# Patient Record
(demographics unavailable — no encounter records)

---

## 2025-01-15 NOTE — CARDIOLOGY SUMMARY
[de-identified] : 1/7/2025 Sinus Rhythm, WITHIN NORMAL LIMITS [de-identified] : 12/12/2023.  Avg HR 80 bpm, 15 SVT, longest 16 beats  bpm, fastest 8 beats with max HR of 240 bpm, occasional VE's [de-identified] : 6/11/2011.  7 METS, EKG changes suggestive of ischemia [de-identified] : 8/17/2018    Mild MR, Normal LV size and function, est PASP=37 mmHg  12/27/2023.  	1. Technically difficult image quality. 2. Left ventricular endocardium is not well visualized. 3. Left ventricular systolic function is grossly normal with an ejection fraction visually estimated at 60 %. 4. Normal left ventricular diastolic function. 5. Trace aortic regurgitation. 6. Trace mitral regurgitation. 7. Mild tricuspid regurgitation. 8. Interatrial septum is aneurysmal. 9. Estimated pulmonary artery systolic pressure is 33 mmHg, consistent with borderline pulmonary hypertension. 10. No pericardial effusion seen. 11. Compared to the transthoracic echocardiogram performed on 8/17/2018 there have been no significant interval changes. [de-identified] : 12/22/2023 MRI brain without contrast shows mild age-related cerebral and cerebral volume loss and chronic ischemic changes.  No acute infarction. 12/22/2023 MR arteriogram of head is no occlusive disease, aneurysm or AV malformation.  Impression normal study 12/22/2023 MR arteriogram neck limited study due to motion artifact.  There appears to be high-grade stenosis of approximately 80% involving the left carotid bulb secondary to plaque recommendation is to consider a CT a of the neck.  1/5/2024 CT angio of the head without and with IV contrast, CT angio neck without and with IV contrast impression: Right pontine punctuate hypodensity representing calcification.  No acute territorial infarction or acute intracranial hemorrhage.  CT angio of the neck left carotid bulb calcified atherosclerosis with up to approximately 72% stenosis of left proximal ICA with flow.  The right ICA is patent.  CT angiography of the head no intracranial vascular occlusion, aneurysm or vascular malformation. [de-identified] : 1/9/2024. 5METS, 4 min. 66 bpm to peak 127 bpm 87%PMHR 1. Electrocardiogram ischemic changes: 1-1.5mm upsloping ST segment depressions inferolaterally. 2. In recovery ST segment depression changed to downsloping 3. EKG changes suggestive of exercise induced ischemia 4. Myocardial Perfusion: Abnormal. Perfusion: Qualitative Findings: There are medium-sized, moderate defect(s) in the mid anterior and mid anterolateral walls and mid lateral wall that are reversible suggestive of ischemia in LAD region ; perhaps some i LCx too  Ventricular Function: The left ventricle is normal in function and normal in size. The resting left ventricular EF% is 72 %. [de-identified] : 8/17/2018. Mild non obstructive carotid disease seen bilaterslly

## 2025-01-15 NOTE — REASON FOR VISIT
[Symptom and Test Evaluation] : symptom and test evaluation [Coronary Artery Disease] : coronary artery disease [Hyperlipidemia] : hyperlipidemia [FreeTextEntry1] : Abnormal MPI, 5 METs with evidence of LAD disease and possible MVD.

## 2025-01-15 NOTE — DISCUSSION/SUMMARY
[EKG obtained to assist in diagnosis and management of assessed problem(s)] : EKG obtained to assist in diagnosis and management of assessed problem(s) [FreeTextEntry1] : 1.  We again reviewed the importance of a healthy lifestyle including: normal body weight, regular physical activity with 30 to 45 minutes of exercise most days of the week, Mediterranean style diet and limiting consumption of sweets, highly processed foods, meats and saturated fats.   2.  Continue present Rx including low-dose aspirin, statin, ACE inhibitor and increase beta-blocker to atenolol 50 mg twice daily. 3.  Again, it was recommended that she proceed with coronary angiography and possible PCI in the setting of probable LAD and possible multivessel disease.   4.  Check lipids and other pre-Cath blood work.  Will probably increase atorvastatin from 40 to 80 mg depending on these results. 5.  We reviewed red flag symptoms such as ongoing chest pain, shortness of breath or sustained palpitations who for which she should call 911. 6. Cardiology follow-up here will be after cardiac catheterization. 7.  I also suggested she follow-up with Dr Shawnee Abbott for primary care including diabetes and positive screening for depression.   I spent 35 minutes with the patient inclduing: 10 minutes preparing for the visit 15 minutes face to face and  10 minutes on documentation and coordination of care.

## 2025-01-15 NOTE — HISTORY OF PRESENT ILLNESS
[FreeTextEntry1] : 74-year-old female with multiple cardiac risk factors including diabetes mellitus type 2, hypertension, hyperlipidemia, abnormal MPI 1/9/2024, 5 METs with evidence of LAD disease and possible MVD.  Based on the abnormal MPI last January she was referred for coronary angiography and possible PCI.  She saw Dr. Valentino in February for a second opinion.  He also recommended coronary angiography and possible PCI.  However, she never followed through with this recommendation.  She returns today for routine follow-up.  She reports progressive symptoms.  She is having chest discomfort often when lying on her back also sometimes with activity is driving here today.  She finds that her walking is more limited numbness of breath after quarter mile, and she is having shortness of breath with a flight of stairs.  She denies palpitations, orthopnea, PND, peripheral edema, presyncope.  She is having less vertigo.  She has not had a recurrent fall. Neurology evaluation after that included an MRI and CT angio of the neck that demonstrated left carotid bulb calcified atherosclerosis with up to approximately 72% stenosis of left proximal ICA with flow.  She is not careful with her diet.  She admits to being a sugarholic.  Hemoglobin A1c 10/31/24 was 6.1.  She has not had a recent lipid profile.   Laboratory 10/31/2024: , K 4.9, creatinine 0.91, Ca 10.1, eGFR 66, Hgb A1c 6.1, TSH 1.24, T4 11.3

## 2025-01-15 NOTE — CARDIOLOGY SUMMARY
[de-identified] : 1/7/2025 Sinus Rhythm, WITHIN NORMAL LIMITS [de-identified] : 12/12/2023.  Avg HR 80 bpm, 15 SVT, longest 16 beats  bpm, fastest 8 beats with max HR of 240 bpm, occasional VE's [de-identified] : 6/11/2011.  7 METS, EKG changes suggestive of ischemia [de-identified] : 8/17/2018    Mild MR, Normal LV size and function, est PASP=37 mmHg  12/27/2023.  	1. Technically difficult image quality. 2. Left ventricular endocardium is not well visualized. 3. Left ventricular systolic function is grossly normal with an ejection fraction visually estimated at 60 %. 4. Normal left ventricular diastolic function. 5. Trace aortic regurgitation. 6. Trace mitral regurgitation. 7. Mild tricuspid regurgitation. 8. Interatrial septum is aneurysmal. 9. Estimated pulmonary artery systolic pressure is 33 mmHg, consistent with borderline pulmonary hypertension. 10. No pericardial effusion seen. 11. Compared to the transthoracic echocardiogram performed on 8/17/2018 there have been no significant interval changes. [de-identified] : 12/22/2023 MRI brain without contrast shows mild age-related cerebral and cerebral volume loss and chronic ischemic changes.  No acute infarction. 12/22/2023 MR arteriogram of head is no occlusive disease, aneurysm or AV malformation.  Impression normal study 12/22/2023 MR arteriogram neck limited study due to motion artifact.  There appears to be high-grade stenosis of approximately 80% involving the left carotid bulb secondary to plaque recommendation is to consider a CT a of the neck.  1/5/2024 CT angio of the head without and with IV contrast, CT angio neck without and with IV contrast impression: Right pontine punctuate hypodensity representing calcification.  No acute territorial infarction or acute intracranial hemorrhage.  CT angio of the neck left carotid bulb calcified atherosclerosis with up to approximately 72% stenosis of left proximal ICA with flow.  The right ICA is patent.  CT angiography of the head no intracranial vascular occlusion, aneurysm or vascular malformation. [de-identified] : 1/9/2024. 5METS, 4 min. 66 bpm to peak 127 bpm 87%PMHR 1. Electrocardiogram ischemic changes: 1-1.5mm upsloping ST segment depressions inferolaterally. 2. In recovery ST segment depression changed to downsloping 3. EKG changes suggestive of exercise induced ischemia 4. Myocardial Perfusion: Abnormal. Perfusion: Qualitative Findings: There are medium-sized, moderate defect(s) in the mid anterior and mid anterolateral walls and mid lateral wall that are reversible suggestive of ischemia in LAD region ; perhaps some i LCx too  Ventricular Function: The left ventricle is normal in function and normal in size. The resting left ventricular EF% is 72 %. [de-identified] : 8/17/2018. Mild non obstructive carotid disease seen bilaterslly

## 2025-01-15 NOTE — ASSESSMENT
[FreeTextEntry1] : 74-year-old woman with multiple risk factors including diabetes, hyperlipidemia and hypertension.  She is having progressive exertional symptoms.  TTE and MPI demonstrate normal LV systolic function at rest. During exercise to 5 METs there is evidence of LAD disease and possible MVD.

## 2025-01-15 NOTE — PHYSICAL EXAM
[Well Developed] : well developed [Well Nourished] : well nourished [No Acute Distress] : no acute distress [Normal Conjunctiva] : normal conjunctiva [Normal Venous Pressure] : normal venous pressure [No Carotid Bruit] : no carotid bruit [Normal S1, S2] : normal S1, S2 [No Murmur] : no murmur [No Rub] : no rub [Clear Lung Fields] : clear lung fields [Good Air Entry] : good air entry [No Respiratory Distress] : no respiratory distress  [Soft] : abdomen soft [Non Tender] : non-tender [Normal Bowel Sounds] : normal bowel sounds [Normal Gait] : normal gait [No Edema] : no edema [No Cyanosis] : no cyanosis [Normal PT B/L] : normal PT B/L [No Focal Deficits] : no focal deficits [Normal Speech] : normal speech [Alert and Oriented] : alert and oriented [Appears Anxious] : appears anxious [Obese] : obese [Moves all extremities] : moves all extremities [de-identified] : Diminished radial pulses

## 2025-01-15 NOTE — ADDENDUM
[FreeTextEntry1] : CT angiography of the neck demonstrates left carotid bulb calcified atherosclerosis with up to approximately 72% stenosis of left proximal ICA.  She has scheduled follow-up with neurology regarding these findings and potential clinical significance.

## 2025-01-15 NOTE — PHYSICAL EXAM
[Well Developed] : well developed [Well Nourished] : well nourished [No Acute Distress] : no acute distress [Normal Conjunctiva] : normal conjunctiva [Normal Venous Pressure] : normal venous pressure [No Carotid Bruit] : no carotid bruit [Normal S1, S2] : normal S1, S2 [No Murmur] : no murmur [No Rub] : no rub [Clear Lung Fields] : clear lung fields [Good Air Entry] : good air entry [No Respiratory Distress] : no respiratory distress  [Soft] : abdomen soft [Non Tender] : non-tender [Normal Bowel Sounds] : normal bowel sounds [Normal Gait] : normal gait [No Edema] : no edema [No Cyanosis] : no cyanosis [Normal PT B/L] : normal PT B/L [No Focal Deficits] : no focal deficits [Normal Speech] : normal speech [Alert and Oriented] : alert and oriented [Appears Anxious] : appears anxious [Obese] : obese [Moves all extremities] : moves all extremities [de-identified] : Diminished radial pulses

## 2025-01-15 NOTE — CARDIOLOGY SUMMARY
[de-identified] : 1/7/2025 Sinus Rhythm, WITHIN NORMAL LIMITS [de-identified] : 12/12/2023.  Avg HR 80 bpm, 15 SVT, longest 16 beats  bpm, fastest 8 beats with max HR of 240 bpm, occasional VE's [de-identified] : 6/11/2011.  7 METS, EKG changes suggestive of ischemia [de-identified] : 8/17/2018    Mild MR, Normal LV size and function, est PASP=37 mmHg  12/27/2023.  	1. Technically difficult image quality. 2. Left ventricular endocardium is not well visualized. 3. Left ventricular systolic function is grossly normal with an ejection fraction visually estimated at 60 %. 4. Normal left ventricular diastolic function. 5. Trace aortic regurgitation. 6. Trace mitral regurgitation. 7. Mild tricuspid regurgitation. 8. Interatrial septum is aneurysmal. 9. Estimated pulmonary artery systolic pressure is 33 mmHg, consistent with borderline pulmonary hypertension. 10. No pericardial effusion seen. 11. Compared to the transthoracic echocardiogram performed on 8/17/2018 there have been no significant interval changes. [de-identified] : 12/22/2023 MRI brain without contrast shows mild age-related cerebral and cerebral volume loss and chronic ischemic changes.  No acute infarction. 12/22/2023 MR arteriogram of head is no occlusive disease, aneurysm or AV malformation.  Impression normal study 12/22/2023 MR arteriogram neck limited study due to motion artifact.  There appears to be high-grade stenosis of approximately 80% involving the left carotid bulb secondary to plaque recommendation is to consider a CT a of the neck.  1/5/2024 CT angio of the head without and with IV contrast, CT angio neck without and with IV contrast impression: Right pontine punctuate hypodensity representing calcification.  No acute territorial infarction or acute intracranial hemorrhage.  CT angio of the neck left carotid bulb calcified atherosclerosis with up to approximately 72% stenosis of left proximal ICA with flow.  The right ICA is patent.  CT angiography of the head no intracranial vascular occlusion, aneurysm or vascular malformation. [de-identified] : 1/9/2024. 5METS, 4 min. 66 bpm to peak 127 bpm 87%PMHR 1. Electrocardiogram ischemic changes: 1-1.5mm upsloping ST segment depressions inferolaterally. 2. In recovery ST segment depression changed to downsloping 3. EKG changes suggestive of exercise induced ischemia 4. Myocardial Perfusion: Abnormal. Perfusion: Qualitative Findings: There are medium-sized, moderate defect(s) in the mid anterior and mid anterolateral walls and mid lateral wall that are reversible suggestive of ischemia in LAD region ; perhaps some i LCx too  Ventricular Function: The left ventricle is normal in function and normal in size. The resting left ventricular EF% is 72 %. [de-identified] : 8/17/2018. Mild non obstructive carotid disease seen bilaterslly

## 2025-01-15 NOTE — PHYSICAL EXAM
[Well Developed] : well developed [Well Nourished] : well nourished [No Acute Distress] : no acute distress [Normal Conjunctiva] : normal conjunctiva [Normal Venous Pressure] : normal venous pressure [No Carotid Bruit] : no carotid bruit [Normal S1, S2] : normal S1, S2 [No Murmur] : no murmur [No Rub] : no rub [Clear Lung Fields] : clear lung fields [Good Air Entry] : good air entry [No Respiratory Distress] : no respiratory distress  [Soft] : abdomen soft [Non Tender] : non-tender [Normal Bowel Sounds] : normal bowel sounds [Normal Gait] : normal gait [No Edema] : no edema [No Cyanosis] : no cyanosis [Normal PT B/L] : normal PT B/L [No Focal Deficits] : no focal deficits [Normal Speech] : normal speech [Alert and Oriented] : alert and oriented [Appears Anxious] : appears anxious [Obese] : obese [Moves all extremities] : moves all extremities [de-identified] : Diminished radial pulses

## 2025-01-30 NOTE — PHYSICAL EXAM
[Well Developed] : well developed [Well Nourished] : well nourished [No Acute Distress] : no acute distress [Normal Conjunctiva] : normal conjunctiva [Normal Venous Pressure] : normal venous pressure [Clear Lung Fields] : clear lung fields [Good Air Entry] : good air entry [No Respiratory Distress] : no respiratory distress  [Soft] : abdomen soft [Non Tender] : non-tender [Normal Gait] : normal gait [Moves all extremities] : moves all extremities [No Focal Deficits] : no focal deficits [Normal Speech] : normal speech [Alert and Oriented] : alert and oriented [Normal memory] : normal memory [de-identified] : RRR, S1 and S2, no mrg  [de-identified] : Well healed RRA and RFA sites with intact distal pulses [facility]

## 2025-01-30 NOTE — DISCUSSION/SUMMARY
[FreeTextEntry1] : 1) CAD - Stable, obstructive CAD seen on 1/24/25 [facility]. Plan for optimal medical management of CTOs - Continue atenolol 50mg BID - Continue ASA 81mg qD - Continue Atorvastatin 40mg qHS - Continue Ramipril 2.5mg qD  2) HTN - Normotensive during visit - Continue atenolol 50mg BID and ramipril 2.5mg qD  3) HLD - Lipid Panel 1/18/25: , HDL 44, LDL 54,  - Continue atorvastatin 40mg qHS   4) Palpitations - Cardiac monitor from 12/2023 showed SR with runs of SVT - Continue atenolol 50mg BID

## 2025-01-30 NOTE — REVIEW OF SYSTEMS
[Negative] : Heme/Lymph [FreeTextEntry2] : As per HPI [FreeTextEntry9] : Chronic Back and shoulder pain [de-identified] : As per HPI

## 2025-01-30 NOTE — PHYSICAL EXAM
[Well Developed] : well developed [Well Nourished] : well nourished [No Acute Distress] : no acute distress [Normal Conjunctiva] : normal conjunctiva [Normal Venous Pressure] : normal venous pressure [Clear Lung Fields] : clear lung fields [Good Air Entry] : good air entry [No Respiratory Distress] : no respiratory distress  [Soft] : abdomen soft [Non Tender] : non-tender [Normal Gait] : normal gait [Moves all extremities] : moves all extremities [No Focal Deficits] : no focal deficits [Normal Speech] : normal speech [Alert and Oriented] : alert and oriented [Normal memory] : normal memory [de-identified] : RRR, S1 and S2, no mrg  [de-identified] : Well healed RRA and RFA sites with intact distal pulses

## 2025-01-30 NOTE — ASSESSMENT
[FreeTextEntry1] : 73 year old female with PMH of HTN, DM2, HLD, L carotid stenosis, and multivessel stable CAD seen on diagnostic C from 1/24/2025 who presents for post catheterization followup. Patient has no acute complaints and well healed RRA and RFA access sites.  Plan to continue medical management for CAD, no plan for  intervention at this time.  If patient develops anginal symptoms despite optimal medical therapy, would consider re-evaluation for possible  intervention.

## 2025-01-30 NOTE — DISCUSSION/SUMMARY
[FreeTextEntry1] : 1) CAD - Stable, obstructive CAD seen on 1/24/25 [facility]. Plan for optimal medical management of CTOs - Continue atenolol 50mg BID - Continue ASA 81mg qD - Continue Atorvastatin 40mg qHS - Continue Ramipril 2.5mg qD  2) HTN - Normotensive during visit - Continue atenolol 50mg BID and ramipril 2.5mg qD  3) HLD - Lipid Panel 1/18/25: , HDL 44, LDL 54,  - Continue atorvastatin 40mg qHS   4) Palpitations - Cardiac monitor from 12/2023 showed SR with runs of SVT - Continue atenolol 50mg BID

## 2025-01-30 NOTE — HISTORY OF PRESENT ILLNESS
[FreeTextEntry1] : Patient is a 73 year old female with PMH of HTN, DM2, HLD, L carotid stenosis, and multivessel stable CAD seen on diagnostic LHC from 2025 who presents for post catheterization followup.  Patient has no acute complaints, reports chronic back pain and chronic fatigue but no notable chest pain or dyspnea.  She previously suffered from migraines and notes an increase in frequency over the past months.  She continues taking her medication as prescribed.  She underwent LHC on  via RFA access which showed moderate, heavily calcified LAD, LCx and RCA disease with  of D1, OM1, and distal small RPL2 with normal LVEDP. No intervention performed and planned for medical management.    Prior Studies: Labs 2025: Lipid Panel: , HDL 44, LDL 54, , BUN/Cr 22/0.87, K 4.4, H/H 13.4/40.1 LHC 2025: Multivessel, heavily calcified CAD with mod LAD stenosis, mild-mod LCx stenosis, mod mid RCA stenosis,  of D1,  of OM1,  of RPL2, normal LVEDP (12 mmHg) Remote/Ambulatory Rhythm Monitorin2023. Avg HR 80 bpm, 15 SVT, longest 16 beats  bpm, fastest 8 beats with max HR of 240 bpm, occasional VE's   Stress Test: 2011. 7METS, EKG changes suggestive of ischemia   Echo: 2018 Mild MR, Normal LV size and function, est PASP=37 mmHg TTE 2023.  1. Technically difficult image quality. 2. Left ventricular endocardium is not well visualized. 3. Left ventricular systolic function is grossly normal with an ejection fraction visually estimated at 60 %. 4. Normal left ventricular diastolic function. 5. Trace aortic regurgitation. 6. Trace mitral regurgitation. 7. Mild tricuspid regurgitation. 8. Interatrial septum is aneurysmal. 9. Estimated pulmonary artery systolic pressure is 33 mmHg, consistent with borderline pulmonary hypertension. 10. No pericardial effusion seen. 11. Compared to the transthoracic echocardiogram performed on 2018 there have been no significant interval changes.   CT/MRI: 2023 MRI brain without contrast shows mild age-related cerebral and cerebral volume loss and chronic ischemic changes. No acute infarction. 2023 MR arteriogram of head is no occlusive disease, aneurysm or AV malformation. Impression normal study  2023 MR arteriogram neck limited study due to motion artifact. There appears to be high-grade stenosis of approximately 80% involving the left carotid bulb secondary to plaque recommendation is to consider a CT a of the neck. 2024 CT angio of the head without and with IV contrast, CT angio neck without and with IV contrast impression: Right pontine punctuate hypodensity representing calcification. No acute territorial infarction or acute intracranial hemorrhage. CT angio of the neck left carotid bulb calcified atherosclerosis with up to approximately 72% stenosis of left proximal ICA with flow. The right ICA is patent. CT angiography of the head no intracranial vascular occlusion, aneurysm or vascular malformation.   Viability/Other Nuclear: 2024. 5METS, 4 min. 66bpm to peak 127 bpm 87%PMHR 1. Electrocardiogram ischemic changes: 1-1.5mm upsloping ST segment depressions inferolaterally. 2. In recovery ST segment depression changed to downsloping 3. EKG changes suggestive of exercise induced ischemia 4. Myocardial Perfusion: Abnormal. Perfusion: Qualitative Findings: There are medium-sized, moderate defect(s) in the mid anterior and mid anterolateral walls and mid lateral wall that are reversible suggestive of ischemia in LAD region ; perhaps some i LCx too Ventricular Function: The left ventricle is normal in function and normal in size. The resting left ventricular EF% is 72 %.   Carotid/Aorta/Peripheral Vascular: 2018. Mild non obstructive carotid disease seen bilaterally

## 2025-01-30 NOTE — REVIEW OF SYSTEMS
[Negative] : Heme/Lymph [FreeTextEntry2] : As per HPI [FreeTextEntry9] : Chronic Back and shoulder pain [de-identified] : As per HPI

## 2025-04-07 NOTE — CARDIOLOGY SUMMARY
[de-identified] : 1/7/2025 Sinus Rhythm, WITHIN NORMAL LIMITS [de-identified] : 12/12/2023.  Avg HR 80 bpm, 15 SVT, longest 16 beats  bpm, fastest 8 beats with max HR of 240 bpm, occasional VE's [de-identified] : 6/11/2011.  7 METS, EKG changes suggestive of ischemia [de-identified] : 8/17/2018    Mild MR, Normal LV size and function, est PASP=37 mmHg  12/27/2023.  	1. Technically difficult image quality. 2. Left ventricular endocardium is not well visualized. 3. Left ventricular systolic function is grossly normal with an ejection fraction visually estimated at 60 %. 4. Normal left ventricular diastolic function. 5. Trace aortic regurgitation. 6. Trace mitral regurgitation. 7. Mild tricuspid regurgitation. 8. Interatrial septum is aneurysmal. 9. Estimated pulmonary artery systolic pressure is 33 mmHg, consistent with borderline pulmonary hypertension. 10. No pericardial effusion seen. 11. Compared to the transthoracic echocardiogram performed on 8/17/2018 there have been no significant interval changes. [de-identified] : 12/22/2023 MRI brain without contrast shows mild age-related cerebral and cerebral volume loss and chronic ischemic changes.  No acute infarction. 12/22/2023 MR arteriogram of head is no occlusive disease, aneurysm or AV malformation.  Impression normal study 12/22/2023 MR arteriogram neck limited study due to motion artifact.  There appears to be high-grade stenosis of approximately 80% involving the left carotid bulb secondary to plaque recommendation is to consider a CT a of the neck.  1/5/2024 CT angio of the head without and with IV contrast, CT angio neck without and with IV contrast impression: Right pontine punctuate hypodensity representing calcification.  No acute territorial infarction or acute intracranial hemorrhage.  CT angio of the neck left carotid bulb calcified atherosclerosis with up to approximately 72% stenosis of left proximal ICA with flow.  The right ICA is patent.  CT angiography of the head no intracranial vascular occlusion, aneurysm or vascular malformation. [de-identified] : 1/9/2024. 5METS, 4 min. 66 bpm to peak 127 bpm 87%PMHR 1. Electrocardiogram ischemic changes: 1-1.5mm upsloping ST segment depressions inferolaterally. 2. In recovery ST segment depression changed to downsloping 3. EKG changes suggestive of exercise induced ischemia 4. Myocardial Perfusion: Abnormal. Perfusion: Qualitative Findings: There are medium-sized, moderate defect(s) in the mid anterior and mid anterolateral walls and mid lateral wall that are reversible suggestive of ischemia in LAD region ; perhaps some i LCx too  Ventricular Function: The left ventricle is normal in function and normal in size. The resting left ventricular EF% is 72 %. [de-identified] : 8/17/2018. Mild non obstructive carotid disease seen bilaterslly

## 2025-04-07 NOTE — PHYSICAL EXAM
[Well Developed] : well developed [Well Nourished] : well nourished [No Acute Distress] : no acute distress [Obese] : obese [Normal Conjunctiva] : normal conjunctiva [Normal Venous Pressure] : normal venous pressure [No Carotid Bruit] : no carotid bruit [Normal S1, S2] : normal S1, S2 [No Murmur] : no murmur [No Rub] : no rub [Clear Lung Fields] : clear lung fields [Good Air Entry] : good air entry [No Respiratory Distress] : no respiratory distress  [Soft] : abdomen soft [Non Tender] : non-tender [Normal Bowel Sounds] : normal bowel sounds [Normal Gait] : normal gait [No Edema] : no edema [No Cyanosis] : no cyanosis [Normal PT B/L] : normal PT B/L [Moves all extremities] : moves all extremities [No Focal Deficits] : no focal deficits [Alert and Oriented] : alert and oriented [Appears Anxious] : appears anxious [de-identified] : Diminished radial pulses

## 2025-04-07 NOTE — REASON FOR VISIT
[Symptom and Test Evaluation] : symptom and test evaluation [Hyperlipidemia] : hyperlipidemia [Coronary Artery Disease] : coronary artery disease [FreeTextEntry1] :  Continue present Rx including low-dose aspirin, statin, ACE inhibitor and increase beta-blocker to atenolol 50 mg twice daily. Check lipids and other pre-Cath blood work.  Will probably increase atorvastatin from 40 to 80 mg depending on these results. 5.  We reviewed red flag symptoms such as ongoing chest pain, shortness of breath or sustained palpitations who for which she should call 911.  I also suggested she follow-up with Dr Shawnee Abbott for primary care including diabetes and positive screening for depression. Home

## 2025-04-07 NOTE — HISTORY OF PRESENT ILLNESS
[FreeTextEntry1] : 74-year-old female with  PMH of HTN, DM2, HLD, L carotid stenosis, and multivessel stable CAD seen on diagnostic LHC from 1/24/2025 who presents for post catheterization followup. Patient has no acute complaints, reports chronic back pain and chronic fatigue but no notable chest pain or dyspnea. She previously suffered from migraines and notes an increase in frequency over the past months. She continues taking her medication as prescribed. She underwent LHC on 1/24 via RFA access which showed moderate, heavily calcified LAD, LCx and RCA disease with  of D1, OM1, and distal small RPL2 with normal LVEDP. No intervention performed and planned for medical management. left carotid bulb calcified atherosclerosis with up to approximately 72% stenosis of left proximal ICA with flow.  She is not careful with her diet.  She admits to being a sugarholic.  Hemoglobin A1c 10/31/24 was 6.1.  She has not had a recent lipid profile.   Laboratory 10/31/2024: , K 4.9, creatinine 0.91, Ca 10.1, eGFR 66, Hgb A1c 6.1, TSH 1.24, T4 11.3

## 2025-04-07 NOTE — DISCUSSION/SUMMARY
[FreeTextEntry1] : 1.  We again reviewed the importance of a healthy lifestyle including: normal body weight, regular physical activity with 30 to 45 minutes of exercise most days of the week, Mediterranean style diet and limiting consumption of sweets, highly processed foods, meats and saturated fats.   2.  Continue present Rx including low-dose aspirin, statin, ACE inhibitor and increase beta-blocker to atenolol 50 mg twice daily.   4.  Check lipids and other pre-Cath blood work.  Will probably increase atorvastatin from 40 to 80 mg depending on these results. 5.  We reviewed red flag symptoms such as ongoing chest pain, shortness of breath or sustained palpitations who for which she should call 911.  7.  I also suggested she follow-up with Dr Shawnee Abbott for primary care including diabetes and positive screening for depression.   I spent 35 minutes with the patient inclduing: 10 minutes preparing for the visit 15 minutes face to face and  10 minutes on documentation and coordination of care.

## 2025-04-07 NOTE — PHYSICAL EXAM
[Well Developed] : well developed [Well Nourished] : well nourished [No Acute Distress] : no acute distress [Obese] : obese [Normal Conjunctiva] : normal conjunctiva [Normal Venous Pressure] : normal venous pressure [No Carotid Bruit] : no carotid bruit [Normal S1, S2] : normal S1, S2 [No Murmur] : no murmur [No Rub] : no rub [Clear Lung Fields] : clear lung fields [Good Air Entry] : good air entry [No Respiratory Distress] : no respiratory distress  [Soft] : abdomen soft [Non Tender] : non-tender [Normal Bowel Sounds] : normal bowel sounds [Normal Gait] : normal gait [No Edema] : no edema [No Cyanosis] : no cyanosis [Normal PT B/L] : normal PT B/L [Moves all extremities] : moves all extremities [No Focal Deficits] : no focal deficits [Alert and Oriented] : alert and oriented [Appears Anxious] : appears anxious [de-identified] : Diminished radial pulses

## 2025-04-07 NOTE — CARDIOLOGY SUMMARY
[de-identified] : 1/7/2025 Sinus Rhythm, WITHIN NORMAL LIMITS [de-identified] : 12/12/2023.  Avg HR 80 bpm, 15 SVT, longest 16 beats  bpm, fastest 8 beats with max HR of 240 bpm, occasional VE's [de-identified] : 6/11/2011.  7 METS, EKG changes suggestive of ischemia [de-identified] : 8/17/2018    Mild MR, Normal LV size and function, est PASP=37 mmHg  12/27/2023.  	1. Technically difficult image quality. 2. Left ventricular endocardium is not well visualized. 3. Left ventricular systolic function is grossly normal with an ejection fraction visually estimated at 60 %. 4. Normal left ventricular diastolic function. 5. Trace aortic regurgitation. 6. Trace mitral regurgitation. 7. Mild tricuspid regurgitation. 8. Interatrial septum is aneurysmal. 9. Estimated pulmonary artery systolic pressure is 33 mmHg, consistent with borderline pulmonary hypertension. 10. No pericardial effusion seen. 11. Compared to the transthoracic echocardiogram performed on 8/17/2018 there have been no significant interval changes. [de-identified] : 12/22/2023 MRI brain without contrast shows mild age-related cerebral and cerebral volume loss and chronic ischemic changes.  No acute infarction. 12/22/2023 MR arteriogram of head is no occlusive disease, aneurysm or AV malformation.  Impression normal study 12/22/2023 MR arteriogram neck limited study due to motion artifact.  There appears to be high-grade stenosis of approximately 80% involving the left carotid bulb secondary to plaque recommendation is to consider a CT a of the neck.  1/5/2024 CT angio of the head without and with IV contrast, CT angio neck without and with IV contrast impression: Right pontine punctuate hypodensity representing calcification.  No acute territorial infarction or acute intracranial hemorrhage.  CT angio of the neck left carotid bulb calcified atherosclerosis with up to approximately 72% stenosis of left proximal ICA with flow.  The right ICA is patent.  CT angiography of the head no intracranial vascular occlusion, aneurysm or vascular malformation. [de-identified] : 1/9/2024. 5METS, 4 min. 66 bpm to peak 127 bpm 87%PMHR 1. Electrocardiogram ischemic changes: 1-1.5mm upsloping ST segment depressions inferolaterally. 2. In recovery ST segment depression changed to downsloping 3. EKG changes suggestive of exercise induced ischemia 4. Myocardial Perfusion: Abnormal. Perfusion: Qualitative Findings: There are medium-sized, moderate defect(s) in the mid anterior and mid anterolateral walls and mid lateral wall that are reversible suggestive of ischemia in LAD region ; perhaps some i LCx too  Ventricular Function: The left ventricle is normal in function and normal in size. The resting left ventricular EF% is 72 %. [de-identified] : 8/17/2018. Mild non obstructive carotid disease seen bilaterslly

## 2025-04-07 NOTE — ASSESSMENT
[FreeTextEntry1] : 74-year-old woman with multiple risk factors including diabetes, hyperlipidemia and hypertension.  She is having progressive exertional symptoms.  TTE and MPI demonstrate normal LV systolic function at rest. During exercise to 5 METs there is evidence of LAD disease and possible MVD.   75 yo F with PMH of HTN, DM2, HLD, L carotid stenosis, and multivessel stable CAD seen on diagnostic C from 1/24/2025. Patient has no acute complaints and plan is for continued medical management for CAD, no plan for  intervention at this time. If patient develops anginal symptoms despite optimal medical therapy, would consider re-evaluation for possible  intervention.

## 2025-04-07 NOTE — ASSESSMENT
[FreeTextEntry1] : 74-year-old woman with multiple risk factors including diabetes, hyperlipidemia and hypertension.  She is having progressive exertional symptoms.  TTE and MPI demonstrate normal LV systolic function at rest. During exercise to 5 METs there is evidence of LAD disease and possible MVD.   73 yo F with PMH of HTN, DM2, HLD, L carotid stenosis, and multivessel stable CAD seen on diagnostic C from 1/24/2025. Patient has no acute complaints and plan is for continued medical management for CAD, no plan for  intervention at this time. If patient develops anginal symptoms despite optimal medical therapy, would consider re-evaluation for possible  intervention.

## 2025-04-07 NOTE — REASON FOR VISIT
[Symptom and Test Evaluation] : symptom and test evaluation [Hyperlipidemia] : hyperlipidemia [Coronary Artery Disease] : coronary artery disease [FreeTextEntry1] :  Continue present Rx including low-dose aspirin, statin, ACE inhibitor and increase beta-blocker to atenolol 50 mg twice daily. Check lipids and other pre-Cath blood work.  Will probably increase atorvastatin from 40 to 80 mg depending on these results. 5.  We reviewed red flag symptoms such as ongoing chest pain, shortness of breath or sustained palpitations who for which she should call 911.  I also suggested she follow-up with Dr Shawnee Abbott for primary care including diabetes and positive screening for depression.